# Patient Record
Sex: FEMALE | Race: OTHER | HISPANIC OR LATINO | Employment: FULL TIME | URBAN - METROPOLITAN AREA
[De-identification: names, ages, dates, MRNs, and addresses within clinical notes are randomized per-mention and may not be internally consistent; named-entity substitution may affect disease eponyms.]

---
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
## 2021-03-11 ENCOUNTER — APPOINTMENT (EMERGENCY)
Dept: RADIOLOGY | Facility: HOSPITAL | Age: 21
End: 2021-03-11
Payer: COMMERCIAL

## 2021-03-11 ENCOUNTER — HOSPITAL ENCOUNTER (EMERGENCY)
Facility: HOSPITAL | Age: 21
Discharge: HOME | End: 2021-03-11
Admitting: EMERGENCY MEDICINE
Payer: COMMERCIAL

## 2021-03-11 ENCOUNTER — APPOINTMENT (EMERGENCY)
Dept: CT IMAGING | Facility: HOSPITAL | Age: 21
End: 2021-03-11
Payer: COMMERCIAL

## 2021-03-11 VITALS
SYSTOLIC BLOOD PRESSURE: 131 MMHG | TEMPERATURE: 98.3 F | WEIGHT: 147.71 LBS | RESPIRATION RATE: 16 BRPM | DIASTOLIC BLOOD PRESSURE: 69 MMHG | HEART RATE: 81 BPM | OXYGEN SATURATION: 100 %
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
## 2021-03-11 DIAGNOSIS — S09.90XA CLOSED HEAD INJURY, INITIAL ENCOUNTER: ICD-10-CM

## 2021-03-11 DIAGNOSIS — M62.838 MUSCLE SPASMS OF NECK: ICD-10-CM

## 2021-03-11 DIAGNOSIS — G44.309 POST-TRAUMATIC HEADACHE, NOT INTRACTABLE: ICD-10-CM

## 2021-03-11 LAB
EXT PREG TEST URINE: NEGATIVE
EXT. CONTROL ED NAV: NORMAL

## 2021-03-11 PROCEDURE — 96374 THER/PROPH/DIAG INJ IV PUSH: CPT

## 2021-03-11 PROCEDURE — 99284 EMERGENCY DEPT VISIT MOD MDM: CPT

## 2021-03-11 PROCEDURE — 96372 THER/PROPH/DIAG INJ SC/IM: CPT

## 2021-03-11 PROCEDURE — 71101 X-RAY EXAM UNILAT RIBS/CHEST: CPT

## 2021-03-11 PROCEDURE — 72125 CT NECK SPINE W/O DYE: CPT

## 2021-03-11 PROCEDURE — 81025 URINE PREGNANCY TEST: CPT | Performed by: PHYSICIAN ASSISTANT

## 2021-03-11 PROCEDURE — 99285 EMERGENCY DEPT VISIT HI MDM: CPT | Performed by: PHYSICIAN ASSISTANT

## 2021-03-11 PROCEDURE — 70450 CT HEAD/BRAIN W/O DYE: CPT

## 2021-03-11 RX ORDER — METHOCARBAMOL 500 MG/1
500 TABLET, FILM COATED ORAL 2 TIMES DAILY
Qty: 20 TABLET | Refills: 0 | Status: SHIPPED | OUTPATIENT
Start: 2021-03-11

## 2021-03-11 RX ORDER — KETOROLAC TROMETHAMINE 30 MG/ML
15 INJECTION, SOLUTION INTRAMUSCULAR; INTRAVENOUS ONCE
Status: COMPLETED | OUTPATIENT
Start: 2021-03-11 | End: 2021-03-11

## 2021-03-11 RX ORDER — DIAZEPAM 5 MG/ML
2.5 INJECTION, SOLUTION INTRAMUSCULAR; INTRAVENOUS ONCE
Status: COMPLETED | OUTPATIENT
Start: 2021-03-11 | End: 2021-03-11

## 2021-03-11 RX ORDER — NAPROXEN 500 MG/1
500 TABLET ORAL 2 TIMES DAILY WITH MEALS
Qty: 30 TABLET | Refills: 0 | Status: SHIPPED | OUTPATIENT
Start: 2021-03-11

## 2021-03-11 RX ORDER — LIDOCAINE 50 MG/G
2 PATCH TOPICAL ONCE
Status: DISCONTINUED | OUTPATIENT
Start: 2021-03-11 | End: 2021-03-11 | Stop reason: HOSPADM

## 2021-03-11 RX ADMIN — DIAZEPAM 2.5 MG: 10 INJECTION, SOLUTION INTRAMUSCULAR; INTRAVENOUS at 12:00

## 2021-03-11 RX ADMIN — LIDOCAINE 2 PATCH: 50 PATCH CUTANEOUS at 13:40

## 2021-03-11 RX ADMIN — KETOROLAC TROMETHAMINE 15 MG: 30 INJECTION, SOLUTION INTRAMUSCULAR at 11:59

## 2021-03-11 NOTE — Clinical Note
Saroj Ohs was seen and treated in our emergency department on 3/11/2021  Diagnosis: Fall, Neck strain    [place]    She may return on this date: 03/14/2021         If you have any questions or concerns, please don't hesitate to call        Adam Griffin PA-C    ______________________________           _______________          _______________  Hospital Representative                              Date                                Time

## 2021-03-11 NOTE — ED PROVIDER NOTES
History  Chief Complaint   Patient presents with    Neck Pain     pt states she slip and fell in bathtub  2 days ago hitting neck and head  pt states she got dizzy afterwards  pain to neck and head  collar place due to tenderness  Renetta rGey is a 18yo female with history of hearing loss, asthma, obesity arriving ambulatory to the emergency department with mother for evaluation of pain resulting from injuries that the patient had suffered from a mechanical fall 2 days prior to hospital arrival   The patient reports that 2 days ago she had slipped in her bath tub, falling backwards and striking the back of her head against the ground  She states that she had felt dizzy shortly afterward which had resolved spontaneously, and denies loss of consciousness  She states that she continues with generalized headaches, stating that her pain is predominantly along the posterior scalp and radiating towards the front of her head  She additionally complains of generalized neck pain and stiffness  She denies light sensitivity, vision deficits, midline neck pain, or any focal deficits, and she denies any dizziness or lightheadedness at this time  The patient also has an area of bruising to the left chest wall with localized pain in this region  She denies any chest pain or shortness of breath  She is not on any blood thinning medications, and has no history of bleeding disorders  The patient's mother states that she has been given the patient naproxen without much improvement  History provided by:  Patient and parent  Fall  Mechanism of injury: fall    Injury location:  Head/neck  Head/neck injury location:  Head, L neck and R neck  Incident location: bathtub  Time since incident:  2 days  Arrived directly from scene: no    Fall:     Fall occurred:   In the bathroom  Prior to arrival data:     Loss of consciousness: no      Amnesic to event: no    Associated symptoms: headaches and neck pain    Associated symptoms: no abdominal pain, no back pain, no chest pain, no loss of consciousness, no nausea and no vomiting        Prior to Admission Medications   Prescriptions Last Dose Informant Patient Reported? Taking? albuterol (VENTOLIN HFA) 90 mcg/act inhaler   Yes No   Sig: Inhale 2 puffs   benzoyl peroxide 5 % external liquid   No No   Sig: Apply topically 2 (two) times a day   Patient not taking: Reported on 12/6/2018    dicyclomine (BENTYL) 20 mg tablet   No No   Sig: Take 1 tablet (20 mg total) by mouth 2 (two) times a day   montelukast (SINGULAIR) 10 mg tablet   Yes No   Sig: Take 1 tablet by mouth   mupirocin (BACTROBAN) 2 % ointment   No No   Sig: Apply topically 3 (three) times a day for 10 days   ondansetron (ZOFRAN-ODT) 4 mg disintegrating tablet   No No   Sig: Take 1 tablet (4 mg total) by mouth every 6 (six) hours as needed for nausea   polyethylene glycol (GLYCOLAX) powder   No No   Sig: Take 17 g by mouth daily for 30 days      Facility-Administered Medications: None       Past Medical History:   Diagnosis Date    Asthma     HL (hearing loss)     Obesity        Past Surgical History:   Procedure Laterality Date    MYRINGOTOMY W/ TUBES  2010       Family History   Problem Relation Age of Onset    Hypertension Mother     Asthma Father     Suicide Attempts Father     No Known Problems Brother     Diabetes Maternal Grandmother     Diabetes Paternal Grandfather      I have reviewed and agree with the history as documented  E-Cigarette/Vaping    E-Cigarette Use Never User      E-Cigarette/Vaping Substances     Social History     Tobacco Use    Smoking status: Never Smoker    Smokeless tobacco: Never Used   Substance Use Topics    Alcohol use: No    Drug use: No       Review of Systems   Constitutional: Negative for chills, fatigue and fever  Respiratory: Negative for shortness of breath  Cardiovascular: Negative for chest pain     Gastrointestinal: Negative for abdominal pain, nausea and vomiting  Musculoskeletal: Positive for neck pain  Negative for back pain  Skin: Negative for pallor and wound  Neurological: Positive for headaches  Negative for dizziness, loss of consciousness, weakness and light-headedness  All other systems reviewed and are negative  Physical Exam  Physical Exam  Vitals signs and nursing note reviewed  Constitutional:       General: She is not in acute distress  Appearance: Normal appearance  She is well-developed  She is obese  She is not ill-appearing, toxic-appearing or diaphoretic  HENT:      Head: Normocephalic and atraumatic  Right Ear: Tympanic membrane, ear canal and external ear normal       Left Ear: Tympanic membrane, ear canal and external ear normal       Mouth/Throat:      Mouth: Mucous membranes are moist    Eyes:      General: Lids are normal  Vision grossly intact  Extraocular Movements: Extraocular movements intact  Right eye: Normal extraocular motion and no nystagmus  Left eye: Normal extraocular motion and no nystagmus  Conjunctiva/sclera: Conjunctivae normal       Pupils: Pupils are equal, round, and reactive to light  Neck:      Musculoskeletal: Full passive range of motion without pain, normal range of motion and neck supple  Muscular tenderness present  No erythema, neck rigidity or spinous process tenderness  Comments: The patient has tenderness to palpation of the paraspinal musculature bilaterally as well as left trapezium spasm  No midline tenderness, bony deformity, of step offs  Cardiovascular:      Rate and Rhythm: Normal rate and regular rhythm  Pulses: Normal pulses  Heart sounds: Normal heart sounds  Pulmonary:      Effort: Pulmonary effort is normal  No respiratory distress  Breath sounds: Normal breath sounds  No wheezing  Chest:      Chest wall: Tenderness present  No deformity  Comments:  There is a small area of ecchymosis to the left posterolateral chest wall with tenderness to palpation  No crepitus, no bony deformities  Abdominal:      General: Bowel sounds are normal  There is no distension  Palpations: Abdomen is soft  Tenderness: There is no abdominal tenderness  Musculoskeletal: Normal range of motion  General: No tenderness  Skin:     General: Skin is warm and dry  Capillary Refill: Capillary refill takes less than 2 seconds  Neurological:      General: No focal deficit present  Mental Status: She is alert  Mental status is at baseline  GCS: GCS eye subscore is 4  GCS verbal subscore is 5  GCS motor subscore is 6  Cranial Nerves: Cranial nerves are intact  Sensory: Sensation is intact  No sensory deficit  Motor: Motor function is intact  Coordination: Coordination is intact  Gait: Gait is intact  Deep Tendon Reflexes: Reflexes are normal and symmetric  Reflex Scores:       Patellar reflexes are 2+ on the right side and 2+ on the left side       Comments: No focal deficits         Vital Signs  ED Triage Vitals [03/11/21 1122]   Temperature Pulse Respirations Blood Pressure SpO2   98 3 °F (36 8 °C) 81 16 131/69 100 %      Temp Source Heart Rate Source Patient Position - Orthostatic VS BP Location FiO2 (%)   Oral Monitor Sitting Right arm --      Pain Score       9           Vitals:    03/11/21 1122   BP: 131/69   Pulse: 81   Patient Position - Orthostatic VS: Sitting         Visual Acuity      ED Medications  Medications   ketorolac (TORADOL) injection 15 mg (15 mg Intramuscular Given 3/11/21 1159)   diazepam (VALIUM) injection 2 5 mg (2 5 mg Intravenous Given 3/11/21 1200)       Diagnostic Studies  Results Reviewed     Procedure Component Value Units Date/Time    POCT pregnancy, urine [[id number]]  (Normal) Resulted: 03/11/21 1158    Lab Status: Final result Updated: 03/11/21 1158     EXT PREG TEST UR (Ref: Negative) negative     Control valid                 CT head without contrast   Final Result by Bakari Cabrera MD (03/11 1320)   No acute intracranial process  No skull fracture  Workstation performed: [id number]      CT spine cervical without contrast   Final Result by Bakari Cabrera MD (03/11 1322)   No cervical spine fracture or traumatic malalignment  Workstation performed: [id number]      XR ribs with pa chest min 3 views LEFT   ED Interpretation by Charlie Turcios PA-C (03/11 1327)   No acute disease      Final Result by Adrien Clark MD (03/11 1432)   No active disease in the chest    No evidence of left rib fracture  Workstation performed: [id number]                 Procedures  Procedures         ED Course  ED Course as of Mar 14 0301   Thu Mar 11, 2021   1331 Patient reassessed, well-appearing with no new or worsening complaints  Patient and mother advised of imaging findings  CT head and cervical spine negative  C-spine collar cleared by me  Patient is stable for discharge  Return precautions given  MDM  Number of Diagnoses or Management Options  Closed head injury, initial encounter: new and requires workup  Fall, initial encounter: new and requires workup  Muscle spasms of neck:   Post-traumatic headache, not intractable: new and requires workup  Diagnosis management comments: This is a 72-year-old female arriving ambulatory to the emergency department with mother for evaluation of headache/neck pain that had resulted from a fall with posterior head strike 2 days ago  The patient reports that 2 nights ago she had slipped in her bathtub, landing on her back and directly striking the back of her head against the ground  The patient states that she felt somewhat dizzy immediately afterward, which had resolved, and denies loss of consciousness  She admits to diffuse pain involving the posterior scalp that radiates toward the front of the head, and also admits to generalized neck discomfort    She denies any focal neurologic deficits  Differential diagnosis includes but not limited to:  Closed head injury, cervical sprain, strain, contusion, posttraumatic headache, tension headache; will assess for acute intracranial abnormality or skull fracture, as well as cervical spine fracture/dislocation or cord injury with imaging    Initial ED plan:  Management options discussed at bedside with the patient and mother  Risks versus benefits of imaging discussed, and patient and mother would like to proceed with imaging further evaluation of injuries  Will also obtain x-ray rib series, pain control    Final ED Assessment:  Vital signs stable on hospital arrival, examination as documented above which is without focal neurologic deficits  GCS 15   Imaging independently reviewed with interpretations made by the radiologist   CT head and cervical spine negative for acute pathology  X-rays are without evidence of acute disease or left rib fractures  The patient's cervical collar that was placed in triage had been cleared by me  The patient and mother were advised of imaging findings with plan for discharge home with supportive measures  Will discharge with scripts for anti-inflammatory and muscle relaxant medications, muscle relaxant precautions given  The patient was provided comprehensive Spine group follow-up if neck pain persists  She was encouraged to return to the emergency department with any new or worsening symptoms  The patient and mother verbalized understanding and they were agreeable with disposition plan  The patient is stable for home at this time           Amount and/or Complexity of Data Reviewed  Tests in the radiology section of CPT®: ordered and reviewed  Obtain history from someone other than the patient: yes  Review and summarize past medical records: yes  Independent visualization of images, tracings, or specimens: yes    Risk of Complications, Morbidity, and/or Mortality  Presenting problems: low  Diagnostic procedures: low  Management options: low    Patient Progress  Patient progress: stable      Disposition  Final diagnoses:   Fall, initial encounter   Closed head injury, initial encounter   Post-traumatic headache, not intractable   Muscle spasms of neck     Time reflects when diagnosis was documented in both MDM as applicable and the Disposition within this note     Time User Action Codes Description Comment    3/11/2021  1:32 PM Rich Chew Add Miquel Hidalgo  AJYV] Fall, initial encounter     3/11/2021  1:32 PM Rich Chew Add [E34 68AU] Closed head injury, initial encounter     3/11/2021  1:32 PM Rich Chew Add [S28 503] Post-traumatic headache, not intractable     3/11/2021  1:32 PM Rich Chew Add [O02 [id number]] Muscle spasms of neck       ED Disposition     ED Disposition Condition Date/Time Comment    Discharge Stable Thu Mar 11, 2021  1:29 PM Juan Lara discharge to home/self care              Follow-up Information     Follow up With Specialties Details Why Contact Info Additional Information    Violet Lizarraga MD Family Medicine  As needed, in 2-3 days for reassessment if symptoms persist [address] [facility] [place] [phone] [address] [facility] Emergency Department Emergency Medicine  If symptoms worsen [facility] [id number]  [address] [facility] Emergency Department, [address] [place] [address] Program Physical Therapy Schedule an appointment as soon as possible for a visit  If neck pain continues [phone]          Discharge Medication List as of 3/11/2021  1:36 PM      START taking these medications    Details   methocarbamol (ROBAXIN) 500 mg tablet Take 1 tablet (500 mg total) by mouth 2 (two) times a day, Starting Thu 3/11/2021, Normal      naproxen (NAPROSYN) 500 mg tablet Take 1 tablet (500 mg total) by mouth 2 (two) times a day with meals, Starting Thu 3/11/2021, Normal         CONTINUE these medications which have NOT CHANGED    Details   albuterol (VENTOLIN HFA) 90 mcg/act inhaler Inhale 2 puffs, Historical Med      benzoyl peroxide 5 % external liquid Apply topically 2 (two) times a day, Starting Thu 10/25/2018, Normal      dicyclomine (BENTYL) 20 mg tablet Take 1 tablet (20 mg total) by mouth 2 (two) times a day, Starting Tue 9/24/2019, Print      montelukast (SINGULAIR) 10 mg tablet Take 1 tablet by mouth, Historical Med      mupirocin (BACTROBAN) 2 % ointment Apply topically 3 (three) times a day for 10 days, Starting Thu 10/25/2018, Until Sun 11/4/2018, Normal      ondansetron (ZOFRAN-ODT) 4 mg disintegrating tablet Take 1 tablet (4 mg total) by mouth every 6 (six) hours as needed for nausea, Starting Tue 9/24/2019, Print      polyethylene glycol (GLYCOLAX) powder Take 17 g by mouth daily for 30 days, Starting Thu 12/6/2018, Until Sat 1/5/2019, Normal           No discharge procedures on file      PDMP Review     None          ED Provider  Electronically Signed by           Guilherme Jerry PA-C  03/14/21 [id number]

## 2021-03-11 NOTE — DISCHARGE INSTRUCTIONS
Please take tylenol, naproxen, robaxin as needed for relief of pain as well as robaxin for relief of spasm  Encourage warm compresses and gentle stretching of the neck  Follow up with [facility] if symptoms persist      Return immediately to the ED if you develop new or worsening symptoms as discussed

## 2021-10-11 ENCOUNTER — HOSPITAL ENCOUNTER (EMERGENCY)
Facility: HOSPITAL | Age: 21
Discharge: HOME | End: 2021-10-11
Attending: EMERGENCY MEDICINE
Payer: COMMERCIAL

## 2021-10-11 VITALS
RESPIRATION RATE: 18 BRPM | WEIGHT: 150.35 LBS | DIASTOLIC BLOOD PRESSURE: 68 MMHG | OXYGEN SATURATION: 98 % | HEART RATE: 78 BPM | SYSTOLIC BLOOD PRESSURE: 131 MMHG | TEMPERATURE: 98.4 F

## 2021-10-11 DIAGNOSIS — J06.9 VIRAL URI WITH COUGH: Primary | ICD-10-CM

## 2021-10-11 LAB
S PYO DNA THROAT QL NAA+PROBE: NORMAL
SARS-COV-2 RNA RESP QL NAA+PROBE: NEGATIVE

## 2021-10-11 PROCEDURE — 99284 EMERGENCY DEPT VISIT MOD MDM: CPT | Performed by: PHYSICIAN ASSISTANT

## 2021-10-11 PROCEDURE — 99283 EMERGENCY DEPT VISIT LOW MDM: CPT

## 2021-10-11 PROCEDURE — U0003 INFECTIOUS AGENT DETECTION BY NUCLEIC ACID (DNA OR RNA); SEVERE ACUTE RESPIRATORY SYNDROME CORONAVIRUS 2 (SARS-COV-2) (CORONAVIRUS DISEASE [COVID-19]), AMPLIFIED PROBE TECHNIQUE, MAKING USE OF HIGH THROUGHPUT TECHNOLOGIES AS DESCRIBED BY CMS-2020-01-R: HCPCS | Performed by: PHYSICIAN ASSISTANT

## 2021-10-11 PROCEDURE — 87651 STREP A DNA AMP PROBE: CPT | Performed by: PHYSICIAN ASSISTANT

## 2021-10-11 PROCEDURE — U0005 INFEC AGEN DETEC AMPLI PROBE: HCPCS | Performed by: PHYSICIAN ASSISTANT

## 2021-10-11 RX ORDER — FLUTICASONE PROPIONATE 50 MCG
1 SPRAY, SUSPENSION (ML) NASAL DAILY
Qty: 16 G | Refills: 0 | Status: SHIPPED | OUTPATIENT
Start: 2021-10-11

## 2021-10-11 RX ORDER — PSEUDOEPHEDRINE HCL 30 MG/5 ML
30 LIQUID (ML) ORAL 4 TIMES DAILY PRN
Qty: 118 ML | Refills: 0 | Status: SHIPPED | OUTPATIENT
Start: 2021-10-11

## 2023-07-02 ENCOUNTER — APPOINTMENT (EMERGENCY)
Dept: ULTRASOUND IMAGING | Facility: HOSPITAL | Age: 23
End: 2023-07-02
Payer: COMMERCIAL

## 2023-07-02 ENCOUNTER — HOSPITAL ENCOUNTER (EMERGENCY)
Facility: HOSPITAL | Age: 23
Discharge: HOME | End: 2023-07-02
Attending: EMERGENCY MEDICINE
Payer: COMMERCIAL

## 2023-07-02 VITALS
SYSTOLIC BLOOD PRESSURE: 110 MMHG | HEART RATE: 86 BPM | RESPIRATION RATE: 16 BRPM | OXYGEN SATURATION: 99 % | DIASTOLIC BLOOD PRESSURE: 65 MMHG | TEMPERATURE: 97.9 F

## 2023-07-02 DIAGNOSIS — B37.31 CANDIDA VAGINITIS: ICD-10-CM

## 2023-07-02 DIAGNOSIS — N76.0 BACTERIAL VAGINOSIS: ICD-10-CM

## 2023-07-02 DIAGNOSIS — B96.89 BACTERIAL VAGINOSIS: ICD-10-CM

## 2023-07-02 DIAGNOSIS — O20.0 THREATENED MISCARRIAGE IN EARLY PREGNANCY: Primary | ICD-10-CM

## 2023-07-02 LAB
ABO GROUP BLD: NORMAL
ALBUMIN SERPL BCP-MCNC: 4.3 G/DL (ref 3.5–5)
ALP SERPL-CCNC: 51 U/L (ref 34–104)
ALT SERPL W P-5'-P-CCNC: 13 U/L (ref 7–52)
ANION GAP SERPL CALCULATED.3IONS-SCNC: 6 MMOL/L
AST SERPL W P-5'-P-CCNC: 11 U/L (ref 13–39)
B-HCG SERPL-ACNC: ABNORMAL MIU/ML (ref 0–5)
BACTERIA UR QL AUTO: ABNORMAL /HPF
BASOPHILS # BLD AUTO: 0.02 THOUSANDS/ÂΜL (ref 0–0.1)
BASOPHILS NFR BLD AUTO: 0 % (ref 0–1)
BILIRUB SERPL-MCNC: 0.18 MG/DL (ref 0.2–1)
BILIRUB UR QL STRIP: NEGATIVE
BUN SERPL-MCNC: 14 MG/DL (ref 5–25)
CALCIUM SERPL-MCNC: 9.5 MG/DL (ref 8.4–10.2)
CHLORIDE SERPL-SCNC: 104 MMOL/L (ref 96–108)
CLARITY UR: ABNORMAL
CLARITY, POC: NORMAL
CO2 SERPL-SCNC: 24 MMOL/L (ref 21–32)
COLOR UR: YELLOW
COLOR, POC: YELLOW
CREAT SERPL-MCNC: 0.56 MG/DL (ref 0.6–1.3)
EOSINOPHIL # BLD AUTO: 0.19 THOUSAND/ÂΜL (ref 0–0.61)
EOSINOPHIL NFR BLD AUTO: 1 % (ref 0–6)
ERYTHROCYTE [DISTWIDTH] IN BLOOD BY AUTOMATED COUNT: 14.6 % (ref 11.6–15.1)
GFR SERPL CREATININE-BSD FRML MDRD: 132 ML/MIN/1.73SQ M
GLUCOSE SERPL-MCNC: 80 MG/DL (ref 65–140)
GLUCOSE UR STRIP-MCNC: NEGATIVE MG/DL
HCT VFR BLD AUTO: 40.9 % (ref 34.8–46.1)
HGB BLD-MCNC: 12.8 G/DL (ref 11.5–15.4)
HGB UR QL STRIP.AUTO: ABNORMAL
IMM GRANULOCYTES # BLD AUTO: 0.06 THOUSAND/UL (ref 0–0.2)
IMM GRANULOCYTES NFR BLD AUTO: 0 % (ref 0–2)
KETONES UR STRIP-MCNC: NEGATIVE MG/DL
LEUKOCYTE ESTERASE UR QL STRIP: NEGATIVE
LYMPHOCYTES # BLD AUTO: 2.78 THOUSANDS/ÂΜL (ref 0.6–4.47)
LYMPHOCYTES NFR BLD AUTO: 18 % (ref 14–44)
MCH RBC QN AUTO: 24.2 PG (ref 26.8–34.3)
MCHC RBC AUTO-ENTMCNC: 31.3 G/DL (ref 31.4–37.4)
MCV RBC AUTO: 77 FL (ref 82–98)
MONOCYTES # BLD AUTO: 0.67 THOUSAND/ÂΜL (ref 0.17–1.22)
MONOCYTES NFR BLD AUTO: 4 % (ref 4–12)
MUCOUS THREADS UR QL AUTO: ABNORMAL
NEUTROPHILS # BLD AUTO: 11.46 THOUSANDS/ÂΜL (ref 1.85–7.62)
NEUTS SEG NFR BLD AUTO: 77 % (ref 43–75)
NITRITE UR QL STRIP: NEGATIVE
NON-SQ EPI CELLS URNS QL MICRO: ABNORMAL /HPF
NRBC BLD AUTO-RTO: 0 /100 WBCS
PH UR STRIP.AUTO: 5.5 [PH] (ref 4.5–8)
PLATELET # BLD AUTO: 329 THOUSANDS/UL (ref 149–390)
PMV BLD AUTO: 10.4 FL (ref 8.9–12.7)
POTASSIUM SERPL-SCNC: 3.6 MMOL/L (ref 3.5–5.3)
PROT SERPL-MCNC: 7.5 G/DL (ref 6.4–8.4)
PROT UR STRIP-MCNC: NEGATIVE MG/DL
RBC # BLD AUTO: 5.29 MILLION/UL (ref 3.81–5.12)
RBC #/AREA URNS AUTO: ABNORMAL /HPF
RH BLD: POSITIVE
SODIUM SERPL-SCNC: 134 MMOL/L (ref 135–147)
SP GR UR STRIP.AUTO: 1.02 (ref 1–1.03)
UROBILINOGEN UR QL STRIP.AUTO: 0.2 E.U./DL
WBC # BLD AUTO: 15.18 THOUSAND/UL (ref 4.31–10.16)
WBC #/AREA URNS AUTO: ABNORMAL /HPF

## 2023-07-02 PROCEDURE — 85025 COMPLETE CBC W/AUTO DIFF WBC: CPT | Performed by: EMERGENCY MEDICINE

## 2023-07-02 PROCEDURE — 81514 NFCT DS BV&VAGINITIS DNA ALG: CPT | Performed by: EMERGENCY MEDICINE

## 2023-07-02 PROCEDURE — 99284 EMERGENCY DEPT VISIT MOD MDM: CPT

## 2023-07-02 PROCEDURE — 86900 BLOOD TYPING SEROLOGIC ABO: CPT | Performed by: EMERGENCY MEDICINE

## 2023-07-02 PROCEDURE — 80053 COMPREHEN METABOLIC PANEL: CPT | Performed by: EMERGENCY MEDICINE

## 2023-07-02 PROCEDURE — 86901 BLOOD TYPING SEROLOGIC RH(D): CPT | Performed by: EMERGENCY MEDICINE

## 2023-07-02 PROCEDURE — 81001 URINALYSIS AUTO W/SCOPE: CPT

## 2023-07-02 PROCEDURE — 76815 OB US LIMITED FETUS(S): CPT

## 2023-07-02 PROCEDURE — 96360 HYDRATION IV INFUSION INIT: CPT

## 2023-07-02 PROCEDURE — 96361 HYDRATE IV INFUSION ADD-ON: CPT

## 2023-07-02 PROCEDURE — 99284 EMERGENCY DEPT VISIT MOD MDM: CPT | Performed by: EMERGENCY MEDICINE

## 2023-07-02 PROCEDURE — 84702 CHORIONIC GONADOTROPIN TEST: CPT | Performed by: EMERGENCY MEDICINE

## 2023-07-02 PROCEDURE — 36415 COLL VENOUS BLD VENIPUNCTURE: CPT | Performed by: EMERGENCY MEDICINE

## 2023-07-02 PROCEDURE — 87086 URINE CULTURE/COLONY COUNT: CPT

## 2023-07-02 RX ORDER — ACETAMINOPHEN 325 MG/1
975 TABLET ORAL ONCE
Status: COMPLETED | OUTPATIENT
Start: 2023-07-02 | End: 2023-07-02

## 2023-07-02 RX ADMIN — SODIUM CHLORIDE 1000 ML: 0.9 INJECTION, SOLUTION INTRAVENOUS at 16:03

## 2023-07-02 RX ADMIN — ACETAMINOPHEN 325MG 975 MG: 325 TABLET ORAL at 16:00

## 2023-07-02 NOTE — ED NOTES
Patient transported to [address] [place] [address], [address] [place] [address]  07/02/23 7934

## 2023-07-02 NOTE — Clinical Note
Vallorie Cheadle was seen and treated in our emergency department on 7/2/2023. Diagnosis:     Inderjit Patel  may return to work on return date. She may return on this date: 07/05/2023         If you have any questions or concerns, please don't hesitate to call.       Leanna rOtiz RN    ______________________________           _______________          _______________  Hospital Representative                              Date                                Time

## 2023-07-02 NOTE — Clinical Note
Monie Hernandez was seen and treated in our emergency department on 7/2/2023. Diagnosis:     Veronica Barber  may return to work on return date. She may return on this date: 07/03/2023         If you have any questions or concerns, please don't hesitate to call.       Bakari Loera RN    ______________________________           _______________          _______________  Hospital Representative                              Date                                Time

## 2023-07-02 NOTE — Clinical Note
Issac Layton was seen and treated in our emergency department on 7/2/2023. Diagnosis:     Michelle Khan  may return to work on return date. She may return on this date: 07/03/2023         If you have any questions or concerns, please don't hesitate to call.       Billy Said, RN    ______________________________           _______________          _______________  Hospital Representative                              Date                                Time

## 2023-07-02 NOTE — Clinical Note
Vero Melendezolz was seen and treated in our emergency department on 7/2/2023. Diagnosis:     Michelle Hawkins  may return to work on return date. She may return on this date: 07/05/2023         If you have any questions or concerns, please don't hesitate to call.       Makenzie Arce MD    ______________________________           _______________          _______________  Hospital Representative                              Date                                Time

## 2023-07-03 LAB
C GLABRATA DNA VAG QL NAA+PROBE: NEGATIVE
C KRUSEI DNA VAG QL NAA+PROBE: NEGATIVE
CANDIDA SP 6 PNL VAG NAA+PROBE: POSITIVE
T VAGINALIS DNA VAG QL NAA+PROBE: NEGATIVE
VAGINOSIS/ITIS DNA PNL VAG PROBE+SIG AMP: POSITIVE

## 2023-07-04 LAB — BACTERIA UR CULT: NORMAL

## 2023-07-05 RX ORDER — METRONIDAZOLE 7.5 MG/G
1 GEL VAGINAL
Qty: 70 G | Refills: 0 | Status: SHIPPED | OUTPATIENT
Start: 2023-07-05

## 2023-07-20 NOTE — ED PROVIDER NOTES
History  Chief Complaint   Patient presents with   • Vaginal Bleeding     Pt reports vaginal bleeding starting today, reports being seen for possible yeast infection, but also pregnant. LMP . Oscar Smith is a pleasant 26-year-old female at estimated 5 weeks gestation by dates complaining of vaginal bleeding that started this morning. She denies significant abdominal pain, fevers but does report some discomfort with urination. She notes that she had thought she had a yeast infection last week and used over-the-counter intravaginal yeast treatments. She states she no longer has discharge but now has bleeding and is not sure if the yeast infection cleared. Otherwise she is tolerating p.o. denies significant nausea/vomiting. Had positive home pregnancy test a few days ago, has not yet seen an OB/GYN. History provided by:  Patient  Vaginal Bleeding  Quality:  Dark red and spotting  Associated symptoms: no abdominal pain, no fever and no nausea        Prior to Admission Medications   Prescriptions Last Dose Informant Patient Reported? Taking?    albuterol (VENTOLIN HFA) 90 mcg/act inhaler   Yes No   Sig: Inhale 2 puffs   benzoyl peroxide 5 % external liquid   No No   Sig: Apply topically 2 (two) times a day   Patient not taking: Reported on 2018    dextromethorphan-guaifenesin ([place] DM)  MG per 12 hr tablet   No No   Sig: Take 1 tablet by mouth every 12 (twelve) hours   dicyclomine (BENTYL) 20 mg tablet   No No   Sig: Take 1 tablet (20 mg total) by mouth 2 (two) times a day   fluticasone (FLONASE) 50 mcg/act nasal spray   No No   Si spray into each nostril daily   methocarbamol (ROBAXIN) 500 mg tablet   No No   Sig: Take 1 tablet (500 mg total) by mouth 2 (two) times a day   montelukast (SINGULAIR) 10 mg tablet   Yes No   Sig: Take 1 tablet by mouth   mupirocin (BACTROBAN) 2 % ointment   No No   Sig: Apply topically 3 (three) times a day for 10 days   naproxen (NAPROSYN) 500 mg tablet No No   Sig: Take 1 tablet (500 mg total) by mouth 2 (two) times a day with meals   ondansetron (ZOFRAN-ODT) 4 mg disintegrating tablet   No No   Sig: Take 1 tablet (4 mg total) by mouth every 6 (six) hours as needed for nausea   polyethylene glycol (GLYCOLAX) powder   No No   Sig: Take 17 g by mouth daily for 30 days   pseudoephedrine (SUDAFED) 30 mg/5 mL oral syrup   No No   Sig: Take 5 mL (30 mg total) by mouth 4 (four) times a day as needed for congestion      Facility-Administered Medications: None       Past Medical History:   Diagnosis Date   • Asthma    • HL (hearing loss)    • Obesity        Past Surgical History:   Procedure Laterality Date   • MYRINGOTOMY W/ TUBES  2010       Family History   Problem Relation Age of Onset   • Hypertension Mother    • Asthma Father    • Suicide Attempts Father    • No Known Problems Brother    • Diabetes Maternal Grandmother    • Diabetes Paternal Grandfather      I have reviewed and agree with the history as documented. E-Cigarette/Vaping   • E-Cigarette Use Never User      E-Cigarette/Vaping Substances   • Nicotine No    • THC No    • CBD No    • Flavoring No    • Other No    • Unknown No      Social History     Tobacco Use   • Smoking status: Never   • Smokeless tobacco: Never   Vaping Use   • Vaping Use: Never used   Substance Use Topics   • Alcohol use: No   • Drug use: No       Review of Systems   Constitutional: Negative for fever. Respiratory: Negative for shortness of breath. Cardiovascular: Negative for chest pain. Gastrointestinal: Negative for abdominal pain, nausea and vomiting. Genitourinary: Positive for vaginal bleeding. Physical Exam  Physical Exam  Vitals and nursing note reviewed. Constitutional:       General: She is not in acute distress. Appearance: She is well-developed. HENT:      Head: Normocephalic and atraumatic. Mouth/Throat:      Mouth: Mucous membranes are moist.      Pharynx: Oropharynx is clear.    Eyes: Conjunctiva/sclera: Conjunctivae normal.   Cardiovascular:      Rate and Rhythm: Normal rate and regular rhythm. Heart sounds: No murmur heard. Pulmonary:      Effort: Pulmonary effort is normal. No respiratory distress. Breath sounds: Normal breath sounds. Abdominal:      Palpations: Abdomen is soft. Tenderness: There is no abdominal tenderness. Musculoskeletal:         General: No swelling. Cervical back: Neck supple. Skin:     General: Skin is warm and dry. Capillary Refill: Capillary refill takes less than 2 seconds. Neurological:      General: No focal deficit present. Mental Status: She is alert and oriented to person, place, and time.    Psychiatric:         Mood and Affect: Mood normal.         Vital Signs  ED Triage Vitals   Temperature Pulse Respirations Blood Pressure SpO2   07/02/23 1648 07/02/23 1451 07/02/23 1451 07/02/23 1451 07/02/23 1451   97.9 °F (36.6 °C) (!) 107 17 135/78 99 %      Temp Source Heart Rate Source Patient Position - Orthostatic VS BP Location FiO2 (%)   07/02/23 1648 07/02/23 1451 07/02/23 1451 07/02/23 1451 --   Oral Monitor Standing Right arm       Pain Score       07/02/23 1451       10 - Worst Possible Pain           Vitals:    07/02/23 1451 07/02/23 1646 07/02/23 1832   BP: 135/78 114/58 110/65   Pulse: (!) 107 82 86   Patient Position - Orthostatic VS: Standing Sitting Lying         Visual Acuity      ED Medications  Medications   acetaminophen (TYLENOL) tablet 975 mg (975 mg Oral Given 7/2/23 1600)   sodium chloride 0.9 % bolus 1,000 mL (0 mL Intravenous Stopped 7/2/23 1835)       Diagnostic Studies  Results Reviewed     Procedure Component Value Units Date/Time    Urine culture [[id number]] Collected: 07/02/23 1551    Lab Status: Final result Specimen: Urine, Clean Catch Updated: 07/04/23 0742     Urine Culture No Growth <1000 cfu/mL    Molecular Vaginal Panel [[id number]]  (Abnormal) Collected: 07/02/23 1641    Lab Status: Final result Specimen: Genital from Vaginal Updated: 07/03/23 1416     Bacterial Vaginosis Positive     Candida species Positive     Candida glabrata Negative     Bianca krusei Negative     Trichomonas vaginalis Negative    hCG, quantitative [[id number]]  (Abnormal) Collected: 07/02/23 1559    Lab Status: Final result Specimen: Blood from Arm, Left Updated: 07/02/23 1704     HCG, Quant 14,176 mIU/mL     Narrative:       Expected Ranges:    HCG results between 5 and 25 mIU/mL may be indicative of early pregnancy but should be interpreted in light of the total clinical presentation. HCG can rise to detectable levels in eileen and post menopausal women (0-11.6 mIU/mL).      Approximate               Approximate HCG  Gestation age          Concentration ( mIU/mL)  _____________          ______________________   Rosalva Sparrow                      HCG values  0.2-1                       5-50  1-2                           2-3                         100-5000  3-4                         500-23898  4-5                         1000-85679  5-6                         [id number]  6-8                         [id number]  8-12                        35113-100247      Comprehensive metabolic panel [[id number]]  (Abnormal) Collected: 07/02/23 1559    Lab Status: Final result Specimen: Blood from Arm, Left Updated: 07/02/23 1624     Sodium 134 mmol/L      Potassium 3.6 mmol/L      Chloride 104 mmol/L      CO2 24 mmol/L      ANION GAP 6 mmol/L      BUN 14 mg/dL      Creatinine 0.56 mg/dL      Glucose 80 mg/dL      Calcium 9.5 mg/dL      AST 11 U/L      ALT 13 U/L      Alkaline Phosphatase 51 U/L      Total Protein 7.5 g/dL      Albumin 4.3 g/dL      Total Bilirubin 0.18 mg/dL      eGFR 132 ml/min/1.73sq m     Narrative:      [place] guidelines for Chronic Kidney Disease (CKD):   •  Stage 1 with normal or high GFR (GFR > 90 mL/min/1.73 square meters)  •  Stage 2 Mild CKD (GFR = 60-89 mL/min/1.73 square meters)  • Stage 3A Moderate CKD (GFR = 45-59 mL/min/1.73 square meters)  •  Stage 3B Moderate CKD (GFR = 30-44 mL/min/1.73 square meters)  •  Stage 4 Severe CKD (GFR = 15-29 mL/min/1.73 square meters)  •  Stage 5 End Stage CKD (GFR <15 mL/min/1.73 square meters)  Note: GFR calculation is accurate only with a steady state creatinine    Urine Microscopic [[id number]]  (Abnormal) Collected: 07/02/23 1551    Lab Status: Final result Specimen: Urine, Clean Catch Updated: 07/02/23 1609     RBC, UA 4-10 /hpf      WBC, UA 4-10 /hpf      Epithelial Cells Occasional /hpf      Bacteria, UA None Seen /hpf      MUCUS THREADS Occasional    CBC and differential [[id number]]  (Abnormal) Collected: 07/02/23 1559    Lab Status: Final result Specimen: Blood from Arm, Left Updated: 07/02/23 1605     WBC 15.18 Thousand/uL      RBC 5.29 Million/uL      Hemoglobin 12.8 g/dL      Hematocrit 40.9 %      MCV 77 fL      MCH 24.2 pg      MCHC 31.3 g/dL      RDW 14.6 %      MPV 10.4 fL      Platelets 200 Thousands/uL      nRBC 0 /100 WBCs      Neutrophils Relative 77 %      Immat GRANS % 0 %      Lymphocytes Relative 18 %      Monocytes Relative 4 %      Eosinophils Relative 1 %      Basophils Relative 0 %      Neutrophils Absolute 11.46 Thousands/µL      Immature Grans Absolute 0.06 Thousand/uL      Lymphocytes Absolute 2.78 Thousands/µL      Monocytes Absolute 0.67 Thousand/µL      Eosinophils Absolute 0.19 Thousand/µL      Basophils Absolute 0.02 Thousands/µL     POCT urinalysis dipstick [[id number]]  (Normal) Resulted: 07/02/23 1556    Lab Status: Final result Specimen: Urine Updated: 07/02/23 1557     Color, UA Yellow     Clarity, UA Slightly Cloudy     EXT Leukocytes, UA --     Nitrite, UA --     Protein, UA -- mg/dl      Glucose, UA --     Ketones, UA -- mg/dl      EXT Urobilinogen, UA --      Bilirubin, UA --     Blood, UA --    Urine Macroscopic, POC [[id number]]  (Abnormal) Collected: 07/02/23 1551    Lab Status: Final result Specimen: Urine Updated: 07/02/23 1553     Color, UA Yellow     Clarity, UA Slightly Cloudy     pH, UA 5.5     Leukocytes, UA Negative     Nitrite, UA Negative     Protein, UA Negative mg/dl      Glucose, UA Negative mg/dl      Ketones, UA Negative mg/dl      Urobilinogen, UA 0.2 E.U./dl      Bilirubin, UA Negative     Occult Blood, UA Large     Specific Gravity, UA 1.025    Narrative:      [address] OB pregnancy limited with transvaginal   Final Result by Tang Ragsdale MD (07/02 1736)      Gestational sac and normal-appearing yolk sac. The gestational sac corresponds to a gestational age of approximately 5 weeks 0 days. No fetal pole identified but likely too early for visualization. Recommend continued serial beta-hCG follow-up. Workstation performed: [id number]                    Procedures  Procedures         ED Course                               SBIRT 20yo+    Flowsheet Row Most Recent Value   Initial Alcohol Screen: US AUDIT-C     1. How often do you have a drink containing alcohol? 0 Filed at: 07/02/2023 1602   2. How many drinks containing alcohol do you have on a typical day you are drinking? 0 Filed at: 07/02/2023 1602   3a. Male UNDER 65: How often do you have five or more drinks on one occasion? 0 Filed at: 07/02/2023 1602   3b. FEMALE Any Age, or MALE 65+: How often do you have 4 or more drinks on one occassion? 0 Filed at: 07/02/2023 1602   Audit-C Score 0 Filed at: 07/02/2023 7597   MARGUERITE: How many times in the past year have you. .. Used an illegal drug or used a prescription medication for non-medical reasons? Never Filed at: 07/02/2023 6269                    Medical Decision Making  50-year-old female here for vaginal bleeding/spotting starting today. Reports seeing some dark blood but no heavy bleeding or passage of clots. No chest pain, shortness of breath, or syncopal/presyncopal symptoms. Serum quantitative around 14,000 today.   Formal ultrasound demonstrates likely IUP with gestational and yolk sac identified but no fetal pole. Discussed with patient need for follow-up hCG in 2 or 3 days. She is also concerned about possible yeast infection. Her symptoms are improving after OTC treatment but will check molecular vaginal panel, call patient with abnormal results. Note: Molecular vaginal panel was positive for bacterial vaginosis and Candida. Call was placed to patient on 7/5 and appropriate prescriptions called to pharmacy. Threatened miscarriage in early pregnancy: acute illness or injury  Amount and/or Complexity of Data Reviewed  Labs: ordered. Radiology: ordered. Decision-making details documented in ED Course. Risk  OTC drugs. Disposition  Final diagnoses:   Threatened miscarriage in early pregnancy   Bacterial vaginosis   Candida vaginitis     Time reflects when diagnosis was documented in both MDM as applicable and the Disposition within this note     Time User Action Codes Description Comment    7/2/2023  6:28 PM Malka Herman Add [O20.0] Threatened miscarriage in early pregnancy     7/5/2023  1:41 PM Lea Ramos Add [N76.0,  B96.89] Bacterial vaginosis     7/5/2023  1:41 PM Aleksandr Kongsudhir Add [B37.31] Candida vaginitis       ED Disposition     ED Disposition   Discharge    Condition   Stable    Date/Time   Sun Jul 2, 2023  6:28 PM    Comment   Gia Gee discharge to home/self care.                Follow-up Information     Follow up With Specialties Details Why Contact Info Additional Information    Fred Cruz MD [facility]   [address] [facility]   [address]  [address] [address] [address], [address] [place], [place], [address]   [phone]          Discharge Medication List as of 7/2/2023  6:29 PM      CONTINUE these medications which have NOT CHANGED    Details   albuterol (VENTOLIN HFA) 90 mcg/act inhaler Inhale 2 puffs, Historical Med      benzoyl peroxide 5 % external liquid Apply topically 2 (two) times a day, Starting Thu 10/25/2018, Normal      dextromethorphan-guaifenesin ([place]UC[place] DM)  MG per 12 hr tablet Take 1 tablet by mouth every 12 (twelve) hours, Starting Mon 10/11/2021, Print      dicyclomine (BENTYL) 20 mg tablet Take 1 tablet (20 mg total) by mouth 2 (two) times a day, Starting Tue 9/24/2019, Print      fluticasone (FLONASE) 50 mcg/act nasal spray 1 spray into each nostril daily, Starting Mon 10/11/2021, Print      methocarbamol (ROBAXIN) 500 mg tablet Take 1 tablet (500 mg total) by mouth 2 (two) times a day, Starting Thu 3/11/2021, Normal      montelukast (SINGULAIR) 10 mg tablet Take 1 tablet by mouth, Historical Med      mupirocin (BACTROBAN) 2 % ointment Apply topically 3 (three) times a day for 10 days, Starting Thu 10/25/2018, Until Sun 11/4/2018, Normal      naproxen (NAPROSYN) 500 mg tablet Take 1 tablet (500 mg total) by mouth 2 (two) times a day with meals, Starting Thu 3/11/2021, Normal      ondansetron (ZOFRAN-ODT) 4 mg disintegrating tablet Take 1 tablet (4 mg total) by mouth every 6 (six) hours as needed for nausea, Starting Tue 9/24/2019, Print      polyethylene glycol (GLYCOLAX) powder Take 17 g by mouth daily for 30 days, Starting Thu 12/6/2018, Until Sat 1/5/2019, Normal      pseudoephedrine (SUDAFED) 30 mg/5 mL oral syrup Take 5 mL (30 mg total) by mouth 4 (four) times a day as needed for congestion, Starting Mon 10/11/2021, Print             Outpatient Discharge Orders   hCG, quantitative   Standing Status: Future Standing Exp.  Date: 07/05/24       PDMP Review     None          ED Provider  Electronically Signed by           Amrik Spivey MD  07/20/23 1106